# Patient Record
Sex: MALE | Race: WHITE | NOT HISPANIC OR LATINO
[De-identification: names, ages, dates, MRNs, and addresses within clinical notes are randomized per-mention and may not be internally consistent; named-entity substitution may affect disease eponyms.]

---

## 2021-03-19 PROBLEM — Z00.00 ENCOUNTER FOR PREVENTIVE HEALTH EXAMINATION: Status: ACTIVE | Noted: 2021-03-19

## 2021-04-01 ENCOUNTER — APPOINTMENT (OUTPATIENT)
Dept: VASCULAR SURGERY | Facility: CLINIC | Age: 51
End: 2021-04-01
Payer: COMMERCIAL

## 2021-04-01 VITALS — HEIGHT: 70 IN | WEIGHT: 170 LBS | BODY MASS INDEX: 24.34 KG/M2

## 2021-04-01 DIAGNOSIS — Z78.9 OTHER SPECIFIED HEALTH STATUS: ICD-10-CM

## 2021-04-01 DIAGNOSIS — I78.1 NEVUS, NON-NEOPLASTIC: ICD-10-CM

## 2021-04-01 DIAGNOSIS — Z80.0 FAMILY HISTORY OF MALIGNANT NEOPLASM OF DIGESTIVE ORGANS: ICD-10-CM

## 2021-04-01 DIAGNOSIS — R20.9 UNSPECIFIED DISTURBANCES OF SKIN SENSATION: ICD-10-CM

## 2021-04-01 PROCEDURE — 99243 OFF/OP CNSLTJ NEW/EST LOW 30: CPT

## 2021-04-01 PROCEDURE — 99203 OFFICE O/P NEW LOW 30 MIN: CPT

## 2021-04-01 RX ORDER — VENLAFAXINE HCL 50 MG
TABLET ORAL
Refills: 0 | Status: ACTIVE | COMMUNITY

## 2021-04-01 RX ORDER — FINASTERIDE 1 MG/1
TABLET ORAL
Refills: 0 | Status: ACTIVE | COMMUNITY

## 2021-04-01 RX ORDER — DEXTROAMPHETAMINE SACCHARATE, AMPHETAMINE ASPARTATE, DEXTROAMPHETAMINE SULFATE, AND AMPHETAMINE SULFATE 3.75; 3.75; 3.75; 3.75 MG/1; MG/1; MG/1; MG/1
TABLET ORAL
Refills: 0 | Status: ACTIVE | COMMUNITY

## 2021-04-01 NOTE — CONSULT LETTER
[Dear  ___] : Dear  [unfilled], [Consult Letter:] : I had the pleasure of evaluating your patient, [unfilled]. [Please see my note below.] : Please see my note below. [Consult Closing:] : Thank you very much for allowing me to participate in the care of this patient.  If you have any questions, please do not hesitate to contact me. [Sincerely,] : Sincerely, [FreeTextEntry3] : Khadar Patton MD, RPVI

## 2021-04-01 NOTE — PHYSICAL EXAM
[Normal Breath Sounds] : Normal breath sounds [2+] : left 2+ [Ankle Swelling (On Exam)] : not present [Ankle Swelling Bilaterally] : bilaterally  [Alert] : alert [Oriented to Person] : oriented to person [Oriented to Place] : oriented to place [Calm] : calm [de-identified] : NAD [de-identified] : NCAT [de-identified] : soft, NT, ND; no palpable masses [de-identified] : supple

## 2021-04-01 NOTE — ASSESSMENT
[FreeTextEntry1] : 50-year-old male with symptoms of acrocyanosis of fingers and toes mostly in response to cold stimuli.  He does not have any history or exam evidence of macrovascular disease, as he has 2+ brachial radial, and 2+ femoral, popliteal, DP and PT pulses bilaterally.  I discussed with him that his macrovascular status is intact, which is consistent with his healthy and active lifestyle and lack of cardiovascular risk factors.  His symptoms may be secondary to a vasospastic disorder such as Raynaud's, however his symptoms are quite mild at this time.  I suggested to him that if his symptoms progress he may benefit from a rheumatological evaluation to assess for vasospasm versus connective tissue disorder and potential treatment with calcium channel blockers.  He will follow up with me on an as-needed basis.

## 2021-04-01 NOTE — HISTORY OF PRESENT ILLNESS
[FreeTextEntry1] : 50-year-old male referred by Dr. Wetzel for evaluation of symptoms related to his fingers and toes.\par He reports that for couple years now he has been having bluish discoloration of his toes and fingers, which is more pronounced in the cold weather and wintertime.  When he has exposure to cold temperatures, he notices that his toes turn purple-blue, he actually notices this when he comes back indoors and the discoloration remains for quite some time.  He denies pain or discomfort.  He denies any alleviating factors besides time.\par In addition to this, he noted some hair loss of his bilateral distal calves, and due to this it was recommended that he undergo vascular evaluation.  He is a lifelong non-smoker, and does not have any cardiovascular risk factors.  He denies a history of CAD, MI, CVA, TIA.  He is quite active, runs frequently, and is a .

## 2022-03-11 ENCOUNTER — APPOINTMENT (OUTPATIENT)
Dept: UROLOGY | Facility: CLINIC | Age: 52
End: 2022-03-11
Payer: COMMERCIAL

## 2022-03-11 VITALS
HEIGHT: 70 IN | HEART RATE: 73 BPM | SYSTOLIC BLOOD PRESSURE: 133 MMHG | WEIGHT: 172 LBS | TEMPERATURE: 97.5 F | DIASTOLIC BLOOD PRESSURE: 79 MMHG | BODY MASS INDEX: 24.62 KG/M2

## 2022-03-11 DIAGNOSIS — R39.16 BENIGN PROSTATIC HYPERPLASIA WITH LOWER URINARY TRACT SYMPMS: ICD-10-CM

## 2022-03-11 DIAGNOSIS — N40.1 BENIGN PROSTATIC HYPERPLASIA WITH LOWER URINARY TRACT SYMPMS: ICD-10-CM

## 2022-03-11 DIAGNOSIS — N52.9 MALE ERECTILE DYSFUNCTION, UNSPECIFIED: ICD-10-CM

## 2022-03-11 PROCEDURE — 99203 OFFICE O/P NEW LOW 30 MIN: CPT

## 2022-03-11 PROCEDURE — 81002 URINALYSIS NONAUTO W/O SCOPE: CPT

## 2022-03-11 NOTE — PHYSICAL EXAM
[General Appearance - Well Developed] : well developed [Normal Appearance] : normal appearance [General Appearance - In No Acute Distress] : no acute distress [Edema] : no peripheral edema [] : no respiratory distress [Abdomen Soft] : soft [Abdomen Hernia] : no hernia was discovered [Urethral Meatus] : meatus normal [Penis Abnormality] : normal circumcised penis [Scrotum] : the scrotum was normal [Epididymis] : the epididymides were normal [Testes Tenderness] : no tenderness of the testes [Testes Mass (___cm)] : there were no testicular masses [Anus Abnormality] : the anus and perineum were normal [Prostate Enlargement] : the prostate was not enlarged [Rectal Exam - Rectum] : digital rectal exam was normal [Prostate Tenderness] : the prostate was not tender [No Prostate Nodules] : no prostate nodules [Prostate Size ___ gm] : prostate size [unfilled] gm [Normal Station and Gait] : the gait and station were normal for the patient's age [Skin Color & Pigmentation] : normal skin color and pigmentation [Oriented To Time, Place, And Person] : oriented to person, place, and time [Inguinal Lymph Nodes Enlarged Bilaterally] : inguinal

## 2022-03-11 NOTE — ASSESSMENT
[FreeTextEntry1] : Patient is a 51 year male who presents with decreased force of stream, straining to void and erectile dysfunction.\par His decrease force of stream has been worsening over the last 2-3 years.  No hematuria or dysuria.\par Hx of orchitis 3 years ago and kidney stones over 5 years ago\par Over the last month he has noted decreased quality of erection or inability to have intercourse.  He has a good libido.  He is on SRIs for OCD and has always had difficulty ejaculating.\par No Fhx of prostate cancer\par PSA 0.24 in 2021\par \par Exam is normal today.\par \par A/P \par 1. BPH\par 2. ED\par Patient not on nitrates\par Cialis 5 mg PO QD ; side effects discussed \par 1 month\par

## 2022-04-11 ENCOUNTER — APPOINTMENT (OUTPATIENT)
Dept: UROLOGY | Facility: CLINIC | Age: 52
End: 2022-04-11

## 2022-04-18 RX ORDER — TAMSULOSIN HYDROCHLORIDE 0.4 MG/1
0.4 CAPSULE ORAL
Qty: 30 | Refills: 0 | Status: ACTIVE | COMMUNITY
Start: 2022-04-18 | End: 1900-01-01

## 2022-04-18 RX ORDER — TADALAFIL 5 MG/1
5 TABLET ORAL
Qty: 20 | Refills: 0 | Status: ACTIVE | COMMUNITY
Start: 2022-03-11 | End: 1900-01-01

## 2022-12-09 ENCOUNTER — NON-APPOINTMENT (OUTPATIENT)
Age: 52
End: 2022-12-09

## 2023-07-03 ENCOUNTER — APPOINTMENT (OUTPATIENT)
Dept: AFTER HOURS CARE | Facility: EMERGENCY ROOM | Age: 53
End: 2023-07-03
Payer: COMMERCIAL
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
## 2023-07-03 PROCEDURE — 99204 OFFICE O/P NEW MOD 45 MIN: CPT | Mod: 95

## 2023-07-03 RX ORDER — DOXYCYCLINE HYCLATE 100 MG/1
100 CAPSULE ORAL
Qty: 20 | Refills: 0 | Status: ACTIVE | COMMUNITY
Start: 2023-07-03 | End: 1900-01-01

## 2023-07-03 NOTE — HISTORY OF PRESENT ILLNESS
[Other Location: e.g. School (Enter Location, City,State)___] : at [unfilled], at the time of the visit. [Other Location: e.g. Home (Enter Location, City,State)___] : at [unfilled] [Verbal consent obtained from patient] : the patient, [unfilled] [FreeTextEntry8] : Otherwise healthy 53 y M noted to have tick bite/embedded tick on R upper arm, removed it yesterday, unsure how long it had been embedded.  Noted to have redness with annular ring surroudning this morning.  No f/c, malaise, body aches, HA, lymphadenopathy, joint swelling.  Has had lyme in the past, rash appears similar.\par PMH denies\par \par R upper arm with central erythematous lesion c/w insect bite and 3-4 cm surrounding erythema with central clearing

## 2023-07-03 NOTE — ASSESSMENT
[FreeTextEntry1] : Insect bite, c rash c/w erythema migrans.  Lives in [place] so while not endemic it is reasonable to treat for lyme.  Pt agreeable, prefers treatment rather than prophylaxis and watchful waiting.

## 2023-07-03 NOTE — PLAN
[FreeTextEntry1] : Doxycycline 100 bid x 10 days\par OTC NSAIDs\par PCP f/u in 7 days\par ED precautions discussed

## 2023-07-05 RX ORDER — DOXYCYCLINE 100 MG/1
100 CAPSULE ORAL TWICE DAILY
Qty: 20 | Refills: 0 | Status: ACTIVE | COMMUNITY
Start: 2023-07-05 | End: 1900-01-01

## 2023-10-26 ENCOUNTER — TRANSCRIPTION ENCOUNTER (OUTPATIENT)
Age: 53
End: 2023-10-26

## 2023-11-26 ENCOUNTER — APPOINTMENT (OUTPATIENT)
Dept: AFTER HOURS CARE | Facility: EMERGENCY ROOM | Age: 53
End: 2023-11-26
Payer: COMMERCIAL

## 2023-11-26 PROCEDURE — 99203 OFFICE O/P NEW LOW 30 MIN: CPT | Mod: 95
